# Patient Record
Sex: MALE | Race: OTHER
[De-identification: names, ages, dates, MRNs, and addresses within clinical notes are randomized per-mention and may not be internally consistent; named-entity substitution may affect disease eponyms.]

---

## 2018-04-16 ENCOUNTER — HOSPITAL ENCOUNTER (EMERGENCY)
Dept: HOSPITAL 54 - ER | Age: 38
Discharge: HOME | End: 2018-04-16
Payer: SELF-PAY

## 2018-04-16 VITALS — DIASTOLIC BLOOD PRESSURE: 72 MMHG | SYSTOLIC BLOOD PRESSURE: 125 MMHG

## 2018-04-16 VITALS — WEIGHT: 312 LBS | HEIGHT: 72 IN | BODY MASS INDEX: 42.26 KG/M2

## 2018-04-16 DIAGNOSIS — R41.82: Primary | ICD-10-CM

## 2018-04-16 LAB
APPEARANCE UR: CLEAR
BASOPHILS # BLD AUTO: 0.1 /CMM (ref 0–0.2)
BASOPHILS NFR BLD AUTO: 0.4 % (ref 0–2)
BILIRUB UR QL STRIP: NEGATIVE
BUN SERPL-MCNC: 13 MG/DL (ref 7–18)
CALCIUM SERPL-MCNC: 8.9 MG/DL (ref 8.5–10.1)
CHLORIDE SERPL-SCNC: 105 MMOL/L (ref 98–107)
CO2 SERPL-SCNC: 22 MMOL/L (ref 21–32)
COLOR UR: YELLOW
CREAT SERPL-MCNC: 1.4 MG/DL (ref 0.6–1.3)
EOSINOPHIL NFR BLD AUTO: 0.2 % (ref 0–6)
ETHANOL SERPL-MCNC: 371 MG/DL (ref 0–0)
GLUCOSE SERPL-MCNC: 164 MG/DL (ref 74–106)
GLUCOSE UR STRIP-MCNC: NEGATIVE MG/DL
HCT VFR BLD AUTO: 42 % (ref 39–51)
HGB BLD-MCNC: 14 G/DL (ref 13.5–17.5)
HGB UR QL STRIP: (no result) ERY/UL
KETONES UR STRIP-MCNC: NEGATIVE MG/DL
LEUKOCYTE ESTERASE UR QL STRIP: NEGATIVE
LYMPHOCYTES NFR BLD AUTO: 1.9 /CMM (ref 0.8–4.8)
LYMPHOCYTES NFR BLD AUTO: 13.3 % (ref 20–44)
MCHC RBC AUTO-ENTMCNC: 33 G/DL (ref 31–36)
MCV RBC AUTO: 86 FL (ref 80–96)
MONOCYTES NFR BLD AUTO: 0.4 /CMM (ref 0.1–1.3)
MONOCYTES NFR BLD AUTO: 2.9 % (ref 2–12)
NEUTROPHILS # BLD AUTO: 11.7 /CMM (ref 1.8–8.9)
NEUTROPHILS NFR BLD AUTO: 83.2 % (ref 43–81)
NITRITE UR QL STRIP: NEGATIVE
PH UR STRIP: 6 [PH] (ref 5–8)
PLATELET # BLD AUTO: 321 /CMM (ref 150–450)
POTASSIUM SERPL-SCNC: 3.9 MMOL/L (ref 3.5–5.1)
PROT UR QL STRIP: NEGATIVE MG/DL
RBC # BLD AUTO: 4.89 MIL/UL (ref 4.5–6)
RBC #/AREA URNS HPF: (no result) /HPF (ref 0–2)
RDW COEFFICIENT OF VARIATION: 13 (ref 11.5–15)
SODIUM SERPL-SCNC: 140 MMOL/L (ref 136–145)
UROBILINOGEN UR STRIP-MCNC: 0.2 EU/DL
WBC #/AREA URNS HPF: (no result) /HPF (ref 0–3)
WBC NRBC COR # BLD AUTO: 14 K/UL (ref 4.3–11)

## 2018-04-16 PROCEDURE — G0480 DRUG TEST DEF 1-7 CLASSES: HCPCS

## 2018-04-16 PROCEDURE — Z7610: HCPCS

## 2018-04-16 PROCEDURE — A4606 OXYGEN PROBE USED W OXIMETER: HCPCS

## 2018-04-16 NOTE — NUR
IN AND OUT CATH DONE. APPROX 1400ML YELLOW URINE OUTPUT NOTED. PT STATED THAT 
HIS NAME WAS AUSTYN ELLIS. PT WAS STILL QUITE INTOXICATED AND WAS SLURRING 
HIS WORDS. PT IS ON THE MONITOR AND CONITNUOUS PULSE OX. WILL CONTINUE TO 
MONITOR THE PT. VSS

## 2018-04-16 NOTE — NUR
PT BIB RA WITH A C/O ETOH. PT IS ACTIVELY VOMITTING AND INTERMITTENTLY WAKES UP 
WITH MOVEMENT. PT IS SLIGHTLY AGGRESSIVE WHEN HE IS AROUSED. PT IS ON THE 
MONITOR AND CONTINUOUS PULSE OX. PT WAS ADJUSTED IN THE BED AND PLACED IN SEMI 
VALDEZ POSITION. PT PLACED ON 2L O2 VIA NC. PT IS ON THE MONITOR AND CONTINOUS 
PULSE OX.

## 2018-04-16 NOTE — NUR
PT WAS YELLING THAT HE HAD TO GO "PEE PEE". PT WAS GIVEN A URINAL, BUT WAS NOT 
ABLE TO USE IT WITHOUT ASSISTANCE. PT STATED THAT HE WAS UNABLE TO URINATE. PT 
STATED THAT IT HURT. DR. KURTZ WAS NOTIFIED. IN AND OUT FERREIRA CATH WAS 
ORDERED. PT AGREED.